# Patient Record
(demographics unavailable — no encounter records)

---

## 2024-12-10 NOTE — PHYSICAL EXAM
[Chaperone Present] : A chaperone was present in the examining room during all aspects of the physical examination [[id number]] : A chaperone was present during the pelvic exam. [FreeTextEntry2] : Adriana MASSEY [Appropriately responsive] : appropriately responsive [Alert] : alert [No Acute Distress] : no acute distress [No Lymphadenopathy] : no lymphadenopathy [Regular Rate Rhythm] : regular rate rhythm [No Murmurs] : no murmurs [Clear to Auscultation B/L] : clear to auscultation bilaterally [Soft] : soft [Non-tender] : non-tender [Non-distended] : non-distended [No HSM] : No HSM [No Lesions] : no lesions [No Mass] : no mass [Oriented x3] : oriented x3 [Examination Of The Breasts] : a normal appearance [Breast Mass Left Breast ___cm] : no mass was palpable [No Masses] : no breast masses were palpable [Labia Majora] : normal [Labia Minora] : normal [Normal] : normal [Uterine Adnexae] : normal

## 2024-12-10 NOTE — COUNSELING
[Nutrition/ Exercise/ Weight Management] : nutrition, exercise, weight management [Body Image] : body image [Vitamins/Supplements] : vitamins/supplements [Breast Self Exam] : breast self exam [Contraception/ Emergency Contraception/ Safe Sexual Practices] : contraception, emergency contraception, safe sexual practices [Confidentiality] : confidentiality [STD (testing, results, tx)] : STD (testing, results, tx) [Influenza Vaccine] : influenza vaccine [Medication Management] : medication management [FreeTextEntry2] : condom use/safety precautions

## 2024-12-10 NOTE — HISTORY OF PRESENT ILLNESS
[Patient reported PAP Smear was normal] : Patient reported PAP Smear was normal [HIV Test offered] : HIV Test offered [Syphilis test offered] : Syphilis test offered [Gonorrhea test offered] : Gonorrhea test offered [Chlamydia test offered] : Chlamydia test offered [HPV test offered] : HPV test offered [Hepatitis B test offered] : Hepatitis B test offered [Hepatitis C test offered] : Hepatitis C test offered [Y] : Patient is sexually active [N] : Patient denies prior pregnancies [TextBox_4] : 22 year old  here for annual exam and refill on oral contraceptive.  Patient is happy with OC's and would like to continue.  She is without complaints today [PapSmeardate] : 12/5/2023 [HIVDate] : 12/5/23 [SyphilisDate] : 12/5/23 [GonorrheaDate] : 12/5/23 [ChlamydiaDate] : 12/5/23 [HPVDate] : 12/5/23 [TextBox_78] : Pap negative no need for HPV reflex [HepatitisCDate] : 12/5/23 [HepatitisBDate] : 12/5/23 [LMPDate] : 11/27/24

## 2024-12-10 NOTE — PROCEDURE
[GC Chlamydia Culture] : GC Chlamydia Culture [Tolerated Well] : the patient tolerated the procedure well

## 2025-04-16 NOTE — REVIEW OF SYSTEMS
[Itching] : Itching [Skin Rash] : skin rash [Fever] : no fever [Chills] : no chills [Vision Problems] : no vision problems [Earache] : no earache [Nasal Discharge] : no nasal discharge [Sore Throat] : no sore throat [Chest Pain] : no chest pain [Palpitations] : no palpitations [Shortness Of Breath] : no shortness of breath [Cough] : no cough [Abdominal Pain] : no abdominal pain [Nausea] : no nausea [Constipation] : no constipation [Diarrhea] : diarrhea [Vomiting] : no vomiting [Dysuria] : no dysuria [Frequency] : no frequency [Mole Changes] : no mole changes [Headache] : no headache [Dizziness] : no dizziness

## 2025-04-16 NOTE — HEALTH RISK ASSESSMENT
[Very Good] : ~his/her~  mood as very good [Yes] : Yes [2 - 4 times a month (2 pts)] : 2-4 times a month (2 points) [1 or 2 (0 pts)] : 1 or 2 (0 points) [Never (0 pts)] : Never (0 points) [No] : In the past 12 months have you used drugs other than those required for medical reasons? No [0] : 2) Feeling down, depressed, or hopeless: Not at all (0) [PHQ-2 Negative - No further assessment needed] : PHQ-2 Negative - No further assessment needed [Patient reported PAP Smear was normal] : Patient reported PAP Smear was normal [With Family] : lives with family [Employed] : employed [Smoke Detector] : smoke detector [Carbon Monoxide Detector] : carbon monoxide detector [Seat Belt] :  uses seat belt [Sunscreen] : uses sunscreen [Never] : Never [Audit-CScore] : 2 [de-identified] : cardio at the gym 2-3 times a week  [de-identified] : sugar cravings, trying to be balanced [[id number]] : 0 [Reports changes in hearing] : Reports no changes in hearing [Reports changes in vision] : Reports no changes in vision [Reports changes in dental health] : Reports no changes in dental health [PapSmearDate] : 12/23 [FreeTextEntry2] : [facility]

## 2025-04-16 NOTE — HISTORY OF PRESENT ILLNESS
[FreeTextEntry1] : CPE [de-identified] : 21yo female who presents to the office for annual physical examination and to establish care.  First time establishing with adult medicine.   She is concerned about her weight.  Reports she usually picks up food on the way to work has iced coffee, breakfast sandwich. Dinner consists of home cooked meal and is balanced between protein, carbs and vegetables. Does go to the gym 2-3 times per week for cardio.   Also with dry patches around her mouth. Wishes to see derm.   She admits to intermittent right hand numbness.   Follows with GYN regularly. History of irregular menstruation, reports at one time was anemic and almost required a blood transfusion.  She is on OCP but is having difficulty finding the right OCP.

## 2025-04-16 NOTE — PHYSICAL EXAM
[No Acute Distress] : no acute distress [Well-Appearing] : well-appearing [Normal Sclera/Conjunctiva] : normal sclera/conjunctiva [EOMI] : extraocular movements intact [Normal Outer Ear/Nose] : the outer ears and nose were normal in appearance [Normal Oropharynx] : the oropharynx was normal [No Lymphadenopathy] : no lymphadenopathy [Supple] : supple [Thyroid Normal, No Nodules] : the thyroid was normal and there were no nodules present [Clear to Auscultation] : lungs were clear to auscultation bilaterally [Normal Rate] : normal rate  [Regular Rhythm] : with a regular rhythm [Normal S1, S2] : normal S1 and S2 [Soft] : abdomen soft [Non Tender] : non-tender [Non-distended] : non-distended [No Joint Swelling] : no joint swelling [Grossly Normal Strength/Tone] : grossly normal strength/tone [Coordination Grossly Intact] : coordination grossly intact [No Focal Deficits] : no focal deficits [Normal Gait] : normal gait [Normal Affect] : the affect was normal [Normal Insight/Judgement] : insight and judgment were intact [de-identified] : eczematous patch lower lip

## 2025-04-16 NOTE — ASSESSMENT
[Vaccines Reviewed] : Immunizations reviewed today. Please see immunization details in the vaccine log within the immunization flowsheet.  [FreeTextEntry1] : #HCM - Patient presenting for annual physical exam - Received flu vaccine this season - Up to date with pap smear - Will check routine blood work fasting and discuss results with patient   #Obesity  - Patient presenting for obesity management and would benefit from GLP therapy as they have not been successful with lifestyle modifications such as reduced calorie diet and exercise regimen.  - Despite modifications made, patients BMI remains at 30.54, weight 167lbs - Starting Wegovy as an adjunct to diet and exercise would assist the patient with significant weight loss and ultimately improve health outcomes by reducing the risk of developing weight related health conditions. - Will check labs, if normal will send Wego for insurance authorization  #Anemia - History of iron deficiency anemia from heavy menses - Repeat iron panel today  #Skin rash - Patches around mouth, likely eczema - Can trial hydrocortisone - Derm referral if persists   #Right hand numbness - Likely carpal tunnel/cubital tunnel - Can use rest for hand at work, keep arms extended at night

## 2025-07-15 NOTE — ASSESSMENT
[FreeTextEntry1] : #Obesity - Patient presenting for follow up of obesity treatment. - Patient requires continuation of therapy for Wegovy for continued weight loss. - Patient started with a BMI of 30.54 and weight of 167 lbs. Patient's current BMI is 29.26 and weight is 160 lbs resulting in at least a 5% weight reduction. - GLP therapy for this patient has proven to be successful and they would benefit from continuation of therapy - Patient has had slow weight loss with Wegovy, can plan to increase to 2.4mg if pregnancy test negative  - If unsuccessful with losing weight with high dose Wegovy will switch to Zepbound   #Amenorrhea - Menses 7 days late  - Urine pregnancy test negative at home  - Check serum pregnancy

## 2025-07-15 NOTE — HISTORY OF PRESENT ILLNESS
[FreeTextEntry1] : Follow up [de-identified] : 21yo female with PMH of obesity presenting for follow up.  Presenting for weight management follow up. Currently taking Wegovy 1.7 weekly. Patient has lost 7 pounds on injectable GLP-1 agonist therapy.  Patient denies any headaches, dizziness, abdominal pain, nausea, vomiting, diarrhea or constipation.  7 days late with menstruation, usually experiences every 30 days. Urine pregnancy test negative at home, reports mother never had positive urine pregnancy test. Requests blood test.